# Patient Record
Sex: MALE | Race: WHITE | NOT HISPANIC OR LATINO | Employment: OTHER | URBAN - NONMETROPOLITAN AREA
[De-identification: names, ages, dates, MRNs, and addresses within clinical notes are randomized per-mention and may not be internally consistent; named-entity substitution may affect disease eponyms.]

---

## 2023-09-01 ENCOUNTER — HOSPITAL ENCOUNTER (EMERGENCY)
Facility: HOSPITAL | Age: 53
Discharge: HOME | End: 2023-09-01
Attending: FAMILY MEDICINE
Payer: MEDICAID

## 2023-09-01 ENCOUNTER — APPOINTMENT (OUTPATIENT)
Dept: ULTRASOUND IMAGING | Facility: HOSPITAL | Age: 53
End: 2023-09-01
Payer: MEDICAID

## 2023-09-01 ENCOUNTER — APPOINTMENT (OUTPATIENT)
Dept: GENERAL RADIOLOGY | Facility: HOSPITAL | Age: 53
End: 2023-09-01
Payer: MEDICAID

## 2023-09-01 VITALS
HEIGHT: 68 IN | BODY MASS INDEX: 33.49 KG/M2 | DIASTOLIC BLOOD PRESSURE: 111 MMHG | SYSTOLIC BLOOD PRESSURE: 190 MMHG | WEIGHT: 221 LBS | OXYGEN SATURATION: 97 % | TEMPERATURE: 97.9 F | RESPIRATION RATE: 16 BRPM | HEART RATE: 94 BPM

## 2023-09-01 DIAGNOSIS — M71.21 POPLITEAL CYST, RIGHT: ICD-10-CM

## 2023-09-01 DIAGNOSIS — M19.90 ARTHRITIS: ICD-10-CM

## 2023-09-01 DIAGNOSIS — G89.29 CHRONIC PAIN OF RIGHT KNEE: ICD-10-CM

## 2023-09-01 DIAGNOSIS — M25.461 KNEE EFFUSION, RIGHT: Primary | ICD-10-CM

## 2023-09-01 DIAGNOSIS — M25.561 CHRONIC PAIN OF RIGHT KNEE: ICD-10-CM

## 2023-09-01 PROCEDURE — 93971 EXTREMITY STUDY: CPT

## 2023-09-01 PROCEDURE — 73562 X-RAY EXAM OF KNEE 3: CPT

## 2023-09-01 PROCEDURE — 99284 EMERGENCY DEPT VISIT MOD MDM: CPT

## 2023-09-01 RX ORDER — HYDROCODONE BITARTRATE AND ACETAMINOPHEN 7.5; 325 MG/1; MG/1
1 TABLET ORAL ONCE
Status: COMPLETED | OUTPATIENT
Start: 2023-09-01 | End: 2023-09-01

## 2023-09-01 RX ORDER — HYDROCODONE BITARTRATE AND ACETAMINOPHEN 5; 325 MG/1; MG/1
1 TABLET ORAL EVERY 6 HOURS PRN
Qty: 12 TABLET | Refills: 0 | Status: SHIPPED | OUTPATIENT
Start: 2023-09-01 | End: 2023-09-08

## 2023-09-01 RX ORDER — MELOXICAM 15 MG/1
15 TABLET ORAL DAILY
Qty: 30 TABLET | Refills: 0 | Status: SHIPPED | OUTPATIENT
Start: 2023-09-01 | End: 2023-09-08 | Stop reason: SDUPTHER

## 2023-09-01 RX ADMIN — HYDROCODONE BITARTRATE AND ACETAMINOPHEN 1 TABLET: 7.5; 325 TABLET ORAL at 02:00

## 2023-09-01 NOTE — ED PROVIDER NOTES
Subjective   History of Present Illness  Patient states that he had a knee injury at age 17 and has had chronic knee pain his entire life.  The swelling has worsened over the past 3 days.  Patient states that he has never seen an orthopedist for his knee pain.      Knee Pain  Location:  Knee  Injury: no    Knee location:  R knee  Pain details:     Quality:  Aching    Severity:  Moderate    Onset quality:  Gradual    Timing:  Constant    Progression:  Worsening  Chronicity:  Chronic  Dislocation: no    Associated symptoms: no fatigue, no fever and no neck pain      Review of Systems   Constitutional:  Negative for appetite change, chills, diaphoresis, fatigue and fever.   HENT:  Negative for congestion, ear discharge, ear pain, nosebleeds, rhinorrhea, sinus pressure, sore throat and trouble swallowing.    Eyes:  Negative for discharge and redness.   Respiratory:  Negative for apnea, cough, chest tightness, shortness of breath and wheezing.    Cardiovascular:  Negative for chest pain.   Gastrointestinal:  Negative for abdominal pain, diarrhea, nausea and vomiting.   Endocrine: Negative for polyuria.   Genitourinary:  Negative for dysuria, frequency and urgency.   Musculoskeletal:  Positive for arthralgias and joint swelling. Negative for myalgias and neck pain.   Skin:  Negative for color change and rash.   Allergic/Immunologic: Negative for immunocompromised state.   Neurological:  Negative for dizziness, seizures, syncope, weakness, light-headedness and headaches.   Hematological:  Negative for adenopathy. Does not bruise/bleed easily.   Psychiatric/Behavioral:  Negative for behavioral problems and confusion.    All other systems reviewed and are negative.    Past Medical History:   Diagnosis Date    Concussion of  spinal cord     Fall from roof     Hypertension     Knee injury     per pt he was 17       No Known Allergies    Past Surgical History:   Procedure Laterality Date    NOSE SURGERY         History  reviewed. No pertinent family history.    Social History     Socioeconomic History    Marital status: Single   Tobacco Use    Smoking status: Some Days     Types: Cigarettes   Vaping Use    Vaping Use: Some days   Substance and Sexual Activity    Alcohol use: Yes     Comment: rarely    Drug use: Yes     Types: Marijuana           Objective   Physical Exam  Vitals and nursing note reviewed.   Constitutional:       Appearance: He is well-developed.   HENT:      Head: Normocephalic and atraumatic.      Nose: Nose normal.   Eyes:      General: No scleral icterus.        Right eye: No discharge.         Left eye: No discharge.      Conjunctiva/sclera: Conjunctivae normal.      Pupils: Pupils are equal, round, and reactive to light.   Neck:      Trachea: No tracheal deviation.   Cardiovascular:      Rate and Rhythm: Normal rate and regular rhythm.      Heart sounds: Normal heart sounds. No murmur heard.  Pulmonary:      Effort: Pulmonary effort is normal. No respiratory distress.      Breath sounds: Normal breath sounds. No stridor. No wheezing or rales.   Abdominal:      General: Bowel sounds are normal. There is no distension.      Palpations: Abdomen is soft. There is no mass.      Tenderness: There is no abdominal tenderness. There is no guarding or rebound.   Musculoskeletal:      Cervical back: Normal range of motion and neck supple.      Right knee: Swelling present. Tenderness present over the medial joint line and lateral joint line.   Skin:     General: Skin is warm and dry.      Findings: No erythema or rash.   Neurological:      Mental Status: He is alert and oriented to person, place, and time.      Coordination: Coordination normal.   Psychiatric:         Behavior: Behavior normal.         Thought Content: Thought content normal.       Procedures           ED Course                                   SIMEON reviewed by Deondre Adler MD       Medical Decision Making  Problems Addressed:  Arthritis:  complicated acute illness or injury  Chronic pain of right knee: complicated acute illness or injury  Knee effusion, right: complicated acute illness or injury  Popliteal cyst, right: complicated acute illness or injury    Amount and/or Complexity of Data Reviewed  Radiology: ordered.    Risk  Prescription drug management.        Final diagnoses:   Knee effusion, right   Chronic pain of right knee   Arthritis   Popliteal cyst, right       ED Disposition  ED Disposition       ED Disposition   Discharge    Condition   Stable    Comment   --               Monty Alvarado MD  [address]  [place] [address]  [phone]    Schedule an appointment as soon as possible for a visit in 3 days           Medication List        New Prescriptions      HYDROcodone-acetaminophen 5-325 MG per tablet  Commonly known as: NORCO  Take 1 tablet by mouth Every 6 (Six) Hours As Needed for Moderate Pain.     meloxicam 15 MG tablet  Commonly known as: MOBIC  Take 1 tablet by mouth Daily.               Where to Get Your Medications        These medications were sent to [facility]/pharmacy #[id number] - [place], [place] - [address] [place] [address] - [phone]  - [phone] [place] [id number]      Phone: [phone]   HYDROcodone-acetaminophen 5-325 MG per tablet  meloxicam 15 MG tablet            Deondre Adler MD  09/01/23 0301

## 2023-09-07 DIAGNOSIS — G89.29 CHRONIC PAIN OF RIGHT KNEE: Primary | ICD-10-CM

## 2023-09-07 DIAGNOSIS — M25.561 CHRONIC PAIN OF RIGHT KNEE: Primary | ICD-10-CM

## 2023-09-08 ENCOUNTER — OFFICE VISIT (OUTPATIENT)
Dept: ORTHOPEDIC SURGERY | Facility: CLINIC | Age: 53
End: 2023-09-08
Payer: MEDICAID

## 2023-09-08 VITALS — WEIGHT: 216 LBS | HEIGHT: 68 IN | BODY MASS INDEX: 32.74 KG/M2

## 2023-09-08 DIAGNOSIS — M25.461 KNEE EFFUSION, RIGHT: ICD-10-CM

## 2023-09-08 DIAGNOSIS — M25.561 CHRONIC PAIN OF RIGHT KNEE: Primary | ICD-10-CM

## 2023-09-08 DIAGNOSIS — G89.29 CHRONIC PAIN OF RIGHT KNEE: Primary | ICD-10-CM

## 2023-09-08 DIAGNOSIS — M17.5 OTHER SECONDARY OSTEOARTHRITIS OF RIGHT KNEE: ICD-10-CM

## 2023-09-08 PROCEDURE — 96372 THER/PROPH/DIAG INJ SC/IM: CPT | Performed by: NURSE PRACTITIONER

## 2023-09-08 PROCEDURE — 99203 OFFICE O/P NEW LOW 30 MIN: CPT | Performed by: NURSE PRACTITIONER

## 2023-09-08 RX ORDER — KETOROLAC TROMETHAMINE 30 MG/ML
80 INJECTION, SOLUTION INTRAMUSCULAR; INTRAVENOUS ONCE
Status: COMPLETED | OUTPATIENT
Start: 2023-09-08 | End: 2023-09-08

## 2023-09-08 RX ORDER — MELOXICAM 15 MG/1
15 TABLET ORAL DAILY
Qty: 30 TABLET | Refills: 1 | Status: SHIPPED | OUTPATIENT
Start: 2023-09-08

## 2023-09-08 RX ORDER — TRIAMCINOLONE ACETONIDE 40 MG/ML
60 INJECTION, SUSPENSION INTRA-ARTICULAR; INTRAMUSCULAR ONCE
Status: COMPLETED | OUTPATIENT
Start: 2023-09-08 | End: 2023-09-08

## 2023-09-08 RX ORDER — HYDROCODONE BITARTRATE AND ACETAMINOPHEN 7.5; 325 MG/1; MG/1
1 TABLET ORAL EVERY 8 HOURS PRN
Qty: 30 TABLET | Refills: 0 | Status: SHIPPED | OUTPATIENT
Start: 2023-09-08 | End: 2023-09-18

## 2023-09-08 RX ADMIN — KETOROLAC TROMETHAMINE 80 MG: 30 INJECTION, SOLUTION INTRAMUSCULAR; INTRAVENOUS at 15:34

## 2023-09-08 RX ADMIN — TRIAMCINOLONE ACETONIDE 60 MG: 40 INJECTION, SUSPENSION INTRA-ARTICULAR; INTRAMUSCULAR at 15:35

## 2023-09-08 NOTE — PROGRESS NOTES
Sloan Marcum is a 53 y.o. male   Primary provider:  Provider, No Known       Chief Complaint   Patient presents with    Right Knee - Initial Evaluation       HISTORY OF PRESENT ILLNESS: Mr. Marcum is a 53-year-old male patient who presents today with complaints of severe, constant right knee pain.  The patient was recently seen in the ER for his complaints on 9/1/2023.  The patient had joint effusion and knee pain.  X-rays were performed, ultrasound, hydrocodone and meloxicam scribed and crutches provided.  The patient describes his pain as constant, severe, grinding with redness, clicking, popping, swelling that is worse with standing, sitting, driving and walking.  The patient reports his pain is severe with pretty much any activity.  The patient reports he has to leave it elevated to relieve his pain.  Patient has tried NSAIDs, acetaminophen, ice, rest, elevation, crutches, hydrocodone and meloxicam.  The patient reports the meloxicam and hydrocodone have helped his pain the most along with the crutches and ice.  Patient denies numbness and tingling.  Denies fever and chills.  This patient reports he works as a .  Patient reports he has not been able to work due to his increased pain.  Patient reports he is interested in having a total knee.  Reviewed the patient's x-rays from 9/1/2023.  Sent the patient for new bilateral AP standing x-ray.    Pain  This is a chronic problem. The current episode started more than 1 year ago. The problem occurs constantly. The problem has been gradually worsening. Associated symptoms include joint swelling. Associated symptoms comments: Grinding, redness, clicking, popping snapping, swelling. The symptoms are aggravated by standing, walking, twisting and bending (driving, sitting). He has tried acetaminophen, ice, heat and rest for the symptoms. The treatment provided mild relief.      CONCURRENT MEDICAL HISTORY:    Past Medical History:   Diagnosis Date     "Concussion of  spinal cord     Fall from roof     Hypertension     Knee injury     per pt he was 17       No Known Allergies      Current Outpatient Medications:     meloxicam (MOBIC) 15 MG tablet, Take 1 tablet by mouth Daily., Disp: 30 tablet, Rfl: 1    HYDROcodone-acetaminophen (NORCO) 7.5-325 MG per tablet, Take 1 tablet by mouth Every 8 (Eight) Hours As Needed for Moderate Pain for up to 10 days., Disp: 30 tablet, Rfl: 0  No current facility-administered medications for this visit.    Past Surgical History:   Procedure Laterality Date    NOSE SURGERY         History reviewed. No pertinent family history.    Social History     Socioeconomic History    Marital status: Single   Tobacco Use    Smoking status: Some Days     Types: Cigarettes   Vaping Use    Vaping Use: Some days    Substances: Nicotine, Flavoring    Devices: Refillable tank   Substance and Sexual Activity    Alcohol use: Yes     Comment: rarely    Drug use: Yes     Types: Marijuana        Review of Systems   Constitutional: Negative.    HENT:  Positive for postnasal drip.         Hay fever   Eyes:  Positive for visual disturbance.   Respiratory: Negative.     Cardiovascular: Negative.         Irregular heartbeat   Gastrointestinal: Negative.    Endocrine: Negative.    Genitourinary: Negative.    Musculoskeletal:  Positive for joint swelling.        Joint pain   Skin: Negative.    Allergic/Immunologic: Negative.    Neurological: Negative.    Hematological: Negative.    Psychiatric/Behavioral:  Positive for sleep disturbance.      PHYSICAL EXAMINATION:       Ht 172.7 cm (68\")   Wt 98 kg (216 lb)   BMI 32.84 kg/m²     Physical Exam  Musculoskeletal:      Right knee: Effusion present.       GAIT:     []  Normal  []  Antalgic    Assistive device: []  None  []  Walker     []  Crutches  []  Cane     []  Wheelchair  []  Stretcher    Right Knee Exam     Range of Motion   Extension:  abnormal   Flexion:  abnormal     Other   Erythema: absent  Sensation: " normal  Swelling: moderate  Effusion: effusion present    Comments:  Severe pain with all arc of motion.  Patient is unable to flex knee past approximately 80 degrees due to the increased pain.  Patient reports diffuse tenderness, medial knee palpation reproduces pain through the knee to the posterior lateral side.  Skin is warm, dry and intact.  No erythema.  Joint effusion noted.  Soft tissue swelling.  Bony abnormalities noted.  Patient has severely arthritic knee.  Distal pulse palpable.  Calf nontender with a negative Homans' sign.              XR Knee 3 View Right    Result Date: 9/1/2023  Narrative: XR KNEE RIGHT 3 VIEWS HISTORY: knee pain/swelling COMPARISON: None FINDINGS: Frontal, lateral and oblique radiographs of the right knee were provided for review. There is no evidence of acute fracture or dislocation.  There is suprapatellar fullness from fusion.. There are severe degenerative changes present..     Impression: 1. Negative for acute fracture.  Joint effusion and severe degenerative change about the knee is noted.     XR Knee Bilateral AP Standing    Result Date: 9/8/2023  Narrative: Comparison: None FINDINGS: Single view of bilateral knees were obtained. There is cortical lucency and irregularity through the proximal right tibia which may represent an acute tibial fracture, although no significant associated soft tissue swelling is noted.  Alternatively, this may represent changes of severe osteoarthritis.  There is also apparent collapse of the lateral femoral condyle.  Recommend further evaluation with CT of the right knee if clinically indicated.  Bony productive changes noted about the patella.     US Venous Doppler Lower Extremity Right (duplex)    Result Date: 9/1/2023  Narrative: US VENOUS DOPPLER LOWER EXTREMITY RIGHT (DUPLEX) HISTORY:  leg swelling COMPARISON: NONE FINDINGS: Transverse and longitudinal grayscale and Doppler sonographic images of the right lower extremity were obtained.  Spectral analysis was also obtained. There is normal flow and compressibility of the right common femoral, superficial femoral, popliteal, and calf veins.     Impression: 1. Normal duplex ultrasound of the right lower extremity without evidence of DVT. There is a joint effusion and small popliteal cyst present. There is an osseous body within the joint from chronic degenerative change..          ASSESSMENT:    Diagnoses and all orders for this visit:    Chronic pain of right knee  -     Comprehensive Metabolic Panel; Future  -     meloxicam (MOBIC) 15 MG tablet; Take 1 tablet by mouth Daily.  -     HYDROcodone-acetaminophen (NORCO) 7.5-325 MG per tablet; Take 1 tablet by mouth Every 8 (Eight) Hours As Needed for Moderate Pain for up to 10 days.  -     triamcinolone acetonide (KENALOG-40) injection 80 mg  -     Discontinue: ketorolac (TORADOL) injection 60 mg  -     ketorolac (TORADOL) injection 60 mg    Knee effusion, right  -     Comprehensive Metabolic Panel; Future  -     meloxicam (MOBIC) 15 MG tablet; Take 1 tablet by mouth Daily.  -     HYDROcodone-acetaminophen (NORCO) 7.5-325 MG per tablet; Take 1 tablet by mouth Every 8 (Eight) Hours As Needed for Moderate Pain for up to 10 days.  -     triamcinolone acetonide (KENALOG-40) injection 80 mg  -     Discontinue: ketorolac (TORADOL) injection 60 mg  -     ketorolac (TORADOL) injection 60 mg    Other secondary osteoarthritis of right knee  -     Comprehensive Metabolic Panel; Future  -     meloxicam (MOBIC) 15 MG tablet; Take 1 tablet by mouth Daily.  -     HYDROcodone-acetaminophen (NORCO) 7.5-325 MG per tablet; Take 1 tablet by mouth Every 8 (Eight) Hours As Needed for Moderate Pain for up to 10 days.  -     triamcinolone acetonide (KENALOG-40) injection 80 mg  -     Discontinue: ketorolac (TORADOL) injection 60 mg  -     ketorolac (TORADOL) injection 60 mg      PLAN  Reviewed the patient's ER note.  Reviewed the patient's x-ray and ultrasound Doppler of the  right lower extremity.  The Doppler indicates no evidence of DVT however, patient has a joint effusion with small popliteal cyst present along with severe chronic degenerative findings.  Sent for a bilateral AP standing x-ray.  Reviewed the x-ray and discussed with the patient.  Advised the patient has severe chronic degenerative findings in his knee.  Patient reports he is aware and states that he is just got to have something done because it is so painful.  Patient is interested in a total knee.  Discussed the conservative measures versus surgical intervention.  Patient states that he does not want to risk getting a corticosteroid injection to the knee if it will delay his total knee.  Recommended the patient continue the previously prescribed meloxicam.  New refill sent to pharmacy.  Artemio reviewed.  Appropriate.  Refill for hydrocodone sent.  Reviewed the risk and benefits of the medication and the common side effects with the patient.  Educated the patient he should not be driving or operating any machinery while taking this medication.  We also discussed he should take sparingly and alternate with Tylenol.  Reminded the patient he should not be taking any other NSAIDs while taking the meloxicam.  Patient verbalized understanding.  Recommended the patient use a cane or crutches.  Also recommended a IM Kenalog and IM Toradol injection.  Reviewed the risk and benefits.  Patient verbalized understanding wished proceed with the injection.  Toradol 60 mg and Kenalog 80 mg IM injection administered.  Patient tolerated well with no immediate reaction. (Correction: Kenalog 60mg and Toradol 80mg not administered.)  Provided patient with a knee immobilizer.  Recommended the patient use the knee immobilizer for weightbearing activity.  Advised the patient this will help reduce flexion which is most painful for him.  Encouraged to elevate, rest and ice.  Recommended the patient to have new labs and establish primary care  provider.  Patient is agreeable to the labs.  We will discuss a primary care provider further at next visit.  CMP ordered we will notify patient of results when available.  Will discuss TKA date with Damion and patient will be notified of plan. Message sent to Damion Acosta CSA.  Advised patient he will follow-up as discussed.  Educated the patient someone will contact him for further instructions.  Discussed with the patient he may follow-up sooner as needed if symptoms change, worsen or for new complaint.    All questions and concerns are addressed with understanding noted. They are aware and are in agreement to this plan.    Return for as discussed. .    VINOD Estrada    This document has been electronically signed by VINOD Estrada on September 11, 2023 09:13 CDT      EMR Dragon/Transciption Disclaimer: Some of this note may be an electronic transcription/translation of spoken language to printed text using the Dragon Dictation System.     Time spent of a minimum of 30 minutes including the face to face evaluation, reviewing of medical history and prior medial records, reviewing of diagnostic studies, ordering additional tests, documentation, patient education and coordination of care.

## 2023-09-11 RX ORDER — KETOROLAC TROMETHAMINE 30 MG/ML
60 INJECTION, SOLUTION INTRAMUSCULAR; INTRAVENOUS ONCE
Status: COMPLETED | OUTPATIENT
Start: 2023-09-11 | End: 2023-09-11

## 2023-09-11 RX ORDER — KETOROLAC TROMETHAMINE 30 MG/ML
60 INJECTION, SOLUTION INTRAMUSCULAR; INTRAVENOUS ONCE
Status: DISCONTINUED | OUTPATIENT
Start: 2023-09-11 | End: 2023-09-11

## 2023-09-11 RX ORDER — TRIAMCINOLONE ACETONIDE 40 MG/ML
80 INJECTION, SUSPENSION INTRA-ARTICULAR; INTRAMUSCULAR ONCE
Status: COMPLETED | OUTPATIENT
Start: 2023-09-11 | End: 2023-09-11

## 2023-09-11 RX ADMIN — KETOROLAC TROMETHAMINE 60 MG: 30 INJECTION, SOLUTION INTRAMUSCULAR; INTRAVENOUS at 08:59

## 2023-09-11 RX ADMIN — TRIAMCINOLONE ACETONIDE 80 MG: 40 INJECTION, SUSPENSION INTRA-ARTICULAR; INTRAMUSCULAR at 08:56
